# Patient Record
(demographics unavailable — no encounter records)

---

## 2025-04-11 NOTE — HISTORY OF PRESENT ILLNESS
[Neck Pain] : neck pain [___ mths] : [unfilled] month(s) ago [Constant] : constant [6] : a current pain level of 6/10 [5] : a minimum pain level of 5/10 [9] : a maximum pain level of 9/10 [Throbbing] : throbbing [Shooting] : shooting [Sitting] : sitting [Turning Head] : turning head [Medications] : medications [FreeTextEntry1] : HPI     Ms. ALETA NEWBY is a 54 year F with pmhx of C5-6 anterior/posterior fusion (2023- Dr Del Angel), hx gastric bypass surgery. Complaints of severe neck and bilateral shoulder pain. Pain worsening with activity. Headaches at times. Medicines have been ineffective at times and with side effects. Pain is impacting her quality of life and ability to perform ADL's. Denies any additional weakness, numbness, bowel/bladder dysfunction, history of bleeding disorders.   Previous and current pain medications/doses/effects: Roxicodone, Tylenol, Advil     Previous Pain Treatments:     Previous Pain Injections:     Previous Diagnostic Studies/Images: 08/13/24 1401  Clinical information:  Requisition #:  [id number]  RADIOLOGY REPORT  ***Signed***  Patient Account # :[id number]  PCP: RAMYA CARVER MD  Sex :F  Ordering AXEL ALDRICH (CHANELLE) N  Report #/Date  [id number] 08/14/24  Description  CT CERVICAL SPINE WO IV CON  Modality  CT  EXAM: CT Cervical Spine without contrast  CLINICAL HISTORY: CERVICAL DISC HERNIATION  TECHNIQUE: Computerized tomography of the cervical spine was performed from the skull base to T1 without the use of intravenous contrast material, Multiplanar reformatted sagittal and coronal images were created from the helical data set.  COMPARISONS: 717/2023 CT  FINDINGS:  Redemonstrated are postsurgical changes related to cervical discectomy and fusion at C5-6. There is previously demonstrated posterior fusion hardware, consisting of bilateral pedicle screws transfixed by vertical rods. Lucency surrounding the C5 pedicle on the right is similar to mildly improved. There has been interval anterior fusion with with disc spacer revision without evidence for new loosening.  The sagittal alignment and heights of the vertebral bodies are preserved.  Generalized bone mineralization is normal. There is no evidence for fracture.  There are mild multilevel degenerative changes.  C2-3: No significant central canal stenosis.  C3-4: Posterior disc osteophyte complex contributing to mild central canal stenosis. No significant neural foraminal stenosis  C4-5: Shallow posterior disc osteophyte complex. No significant central canal or neural foraminal stenosis.  C5-6: Anterior and posterior fusion. Evaluation of the central canal suboptimal secondary to metallic hardware artifact  C6-7: Suspected broad posterior disc osteophyte complex contributing to mild central canal stenosis. Bulging disc material may extend into the neural foramen, although difficult to assess due to artifact  C7-T1: Shallow posterior disc osteophyte complex. No sniff consent canal stenosis.  IMPRESSION:  Redemonstrated postsurgical changes related to posterior fusion at C5-6, with interval anterior fusion at the same level.  Multilevel degenerative changes in the cervical spine, resulting in mild central canal stenoses as detailed above. If there is persistent clinical concern, consider follow-up MRI.     [FreeTextEntry7] : Edgard shoulder pain  [FreeTextEntry3] : cold air [FreeTextEntry4] : otc meds

## 2025-04-11 NOTE — PHYSICAL EXAM
[Normal] : Well developed, in no acute distress, alert and oriented to person, place and time [Normal muscle bulk without asymmetry] : normal muscle bulk without asymmetry [Facet Tenderness] : facet tenderness [Spine: Flexion to ___ degrees, without pain] : spine: flexion to [unfilled] degrees, without pain [Spurling] : positive Spurling's Test [] : Motor:

## 2025-04-11 NOTE — ASSESSMENT
[FreeTextEntry1] : >> Imaging and Other Studies   I personally reviewed the relevant imaging.  Discussed and explained to patient the likely source of pathology and pain.  Questions answered.  >> Therapy and Other Modalities  PT   >> Medications  cymbalta 20mg cautioned change in mood.  Encouraged to call with any worsening mood or depression/suicidal ideations   >> Interventions    MRI demonstrating disc herniation causing radiculopathy, significantly impactful to ability to perform ADL and refractory to conservative treatments, including physician directed exercises/PT ().  Will schedule C7-T1 interlaminar epidural steroid injection r/b/a discussed  >> Consults   >> Discussion of Risks/Benefits/Alternatives    >Regarding any scheduled procedures:   In the event the patient is scheduled for a procedure,   I have discussed in detail with the patient that any interventional pain procedure is associated with potential risks.  The procedure may include an injection of steroids and potentially other medications (local anesthetic and normal saline) into the epidural space or surrounding tissue of the spine.  There are significant risks of this procedure which include and are not limited to infection, bleeding, worsening pain, dural puncture leading to postdural puncture headache, nerve damage, spinal cord injury, paralysis, stroke, and death.   There is a chance that the procedure does not improve their pain.   There are risks associated with the steroid being absorbed into the body systemically.  These include dysphoria, difficulty sleeping, mood swings and personality changes.  Premenopausal women may notice an irregularity in her menstrual cycle for 2-3 months following the injection.  Steroids can specifically affect patients with hypertension, diabetes, and peptic ulcers.  The procedure may cause a temporary increase in blood pressure and blood pressure, and may adversely affect a peptic ulcer.  Other, more rare complications, include avascular necrosis of joints, glaucoma and worsening of osteoporosis.   I have discussed the risks of the procedure at length with the patient, and the potential benefits of pain relief.  I have offered alternatives to the procedure.  All questions were answered.   The patient expressed understanding and wishes to proceed with the procedure.   > Longitudinal management of Complex Painful condition   The patient is being managed for a complex condition that requires ongoing management.  The nature of this condition demands nuanced approach to treatment.  The seriousness of the condition necessitates an in-depth and focused approach to management and coordination with other healthcare professionals.    This visit involves intricate evaluation and management of the patient's condition.  The complexity of the visit was due to the need for detailed assessment of the current state, consideration of potential complications and a careful balancing of treatment options to management the chronic condition effectively.   As detailed above, the patient has a chronic significant painful condition that requires regular and detailed management.  The condition's impact on the patient's quality of life and health is substantial and necessitates a comprehensive and tailored approach   >> Conclusion   There were no barriers to communication. Informed patient that I would be available for any additional questions. Patient was instructed to call with any worsening symptoms including severe pain, new numbness/weakness, or changes in the bowel/bladder function. Discussed role of nsaids in pain management and all relevant risks, if patient is continuing to require after 4 weeks the patient should f/u for alternative treatment. Instructed patient to maintain pain diary to monitor pain level, mobility, and function.

## 2025-06-06 NOTE — HISTORY OF PRESENT ILLNESS
[Neck Pain] : neck pain [___ mths] : [unfilled] month(s) ago [Constant] : constant [6] : a current pain level of 6/10 [5] : a minimum pain level of 5/10 [9] : a maximum pain level of 9/10 [Throbbing] : throbbing [Shooting] : shooting [Sitting] : sitting [Turning Head] : turning head [Medications] : medications [FreeTextEntry1] : Interval Note:  sp C7-T1 interlaminar epidural steroid injection with improvement in neck and arm pain. Patient reports improvement in ROM.   Since last visit the pain is improved. Denies any additional weakness, numbness, bowel/bladder dysfunction.  Unable to tolerate cymbalta.  HPI     Ms. ALETA NEWBY is a 54 year F with pmhx of C5-6 anterior/posterior fusion (2023- Dr Del Angel), hx gastric bypass surgery. Complaints of severe neck and bilateral shoulder pain. Pain worsening with activity. Headaches at times. Medicines have been ineffective at times and with side effects. Pain is impacting her quality of life and ability to perform ADL's. Denies any additional weakness, numbness, bowel/bladder dysfunction, history of bleeding disorders.   Previous and current pain medications/doses/effects: Roxicodone, Tylenol, Advil     Previous Pain Treatments:     Previous Pain Injections:   C7-T1 interlaminar epidural steroid injection 5/23/25  Previous Diagnostic Studies/Images: 08/13/24 1401  Clinical information:  Requisition #:  [id number]  RADIOLOGY REPORT  ***Signed***  Patient Account # :[id number]  PCP: RAMYA CARVER MD  Sex :F  AXEL [place] (CHANELLE) N  Report #/Date  [id number] 08/14/24  Description  CT CERVICAL SPINE WO IV CON  Modality  CT  EXAM: CT Cervical Spine without contrast  CLINICAL HISTORY: CERVICAL DISC HERNIATION  TECHNIQUE: Computerized tomography of the cervical spine was performed from the skull base to T1 without the use of intravenous contrast material, Multiplanar reformatted sagittal and coronal images were created from the helical data set.  COMPARISONS: 717/2023 CT  FINDINGS:  Redemonstrated are postsurgical changes related to cervical discectomy and fusion at C5-6. There is previously demonstrated posterior fusion hardware, consisting of bilateral pedicle screws transfixed by vertical rods. Lucency surrounding the C5 pedicle on the right is similar to mildly improved. There has been interval anterior fusion with with disc spacer revision without evidence for new loosening.  The sagittal alignment and heights of the vertebral bodies are preserved.  Generalized bone mineralization is normal. There is no evidence for fracture.  There are mild multilevel degenerative changes.  C2-3: No significant central canal stenosis.  C3-4: Posterior disc osteophyte complex contributing to mild central canal stenosis. No significant neural foraminal stenosis  C4-5: Shallow posterior disc osteophyte complex. No significant central canal or neural foraminal stenosis.  C5-6: Anterior and posterior fusion. Evaluation of the central canal suboptimal secondary to metallic hardware artifact  C6-7: Suspected broad posterior disc osteophyte complex contributing to mild central canal stenosis. Bulging disc material may extend into the neural foramen, although difficult to assess due to artifact  C7-T1: Shallow posterior disc osteophyte complex. No sniff consent canal stenosis.  IMPRESSION:  Redemonstrated postsurgical changes related to posterior fusion at C5-6, with interval anterior fusion at the same level.  Multilevel degenerative changes in the cervical spine, resulting in mild central canal stenoses as detailed above. If there is persistent clinical concern, consider follow-up MRI.     [FreeTextEntry7] : Edgard shoulder pain  [FreeTextEntry3] : cold air [FreeTextEntry4] : otc meds

## 2025-06-06 NOTE — ASSESSMENT
[FreeTextEntry1] : >> Imaging and Other Studies   I personally reviewed the relevant imaging.  Discussed and explained to patient the likely source of pathology and pain.  Questions answered.  >> Therapy and Other Modalities  PT   >> Medications  trial gabapentin uptitrate to TID cautioned change in mood.  Encouraged to call with any worsening mood or depression/suicidal ideations  >> Interventions    MRI demonstrating disc herniation causing radiculopathy, significantly impactful to ability to perform ADL and refractory to conservative treatments, including physician directed exercises/PT ().  sp C7-T1 interlaminar epidural steroid injection with significant improvement  >> Consults   >> Discussion of Risks/Benefits/Alternatives    >Regarding any scheduled procedures:   In the event the patient is scheduled for a procedure,   I have discussed in detail with the patient that any interventional pain procedure is associated with potential risks.  The procedure may include an injection of steroids and potentially other medications (local anesthetic and normal saline) into the epidural space or surrounding tissue of the spine.  There are significant risks of this procedure which include and are not limited to infection, bleeding, worsening pain, dural puncture leading to postdural puncture headache, nerve damage, spinal cord injury, paralysis, stroke, and death.   There is a chance that the procedure does not improve their pain.   There are risks associated with the steroid being absorbed into the body systemically.  These include dysphoria, difficulty sleeping, mood swings and personality changes.  Premenopausal women may notice an irregularity in her menstrual cycle for 2-3 months following the injection.  Steroids can specifically affect patients with hypertension, diabetes, and peptic ulcers.  The procedure may cause a temporary increase in blood pressure and blood pressure, and may adversely affect a peptic ulcer.  Other, more rare complications, include avascular necrosis of joints, glaucoma and worsening of osteoporosis.   I have discussed the risks of the procedure at length with the patient, and the potential benefits of pain relief.  I have offered alternatives to the procedure.  All questions were answered.   The patient expressed understanding and wishes to proceed with the procedure.   > Longitudinal management of Complex Painful condition   The patient is being managed for a complex condition that requires ongoing management.  The nature of this condition demands nuanced approach to treatment.  The seriousness of the condition necessitates an in-depth and focused approach to management and coordination with other healthcare professionals.    This visit involves intricate evaluation and management of the patient's condition.  The complexity of the visit was due to the need for detailed assessment of the current state, consideration of potential complications and a careful balancing of treatment options to management the chronic condition effectively.   As detailed above, the patient has a chronic significant painful condition that requires regular and detailed management.  The condition's impact on the patient's quality of life and health is substantial and necessitates a comprehensive and tailored approach   >> Conclusion   There were no barriers to communication. Informed patient that I would be available for any additional questions. Patient was instructed to call with any worsening symptoms including severe pain, new numbness/weakness, or changes in the bowel/bladder function. Discussed role of nsaids in pain management and all relevant risks, if patient is continuing to require after 4 weeks the patient should f/u for alternative treatment. Instructed patient to maintain pain diary to monitor pain level, mobility, and function.